# Patient Record
Sex: FEMALE | Race: WHITE | URBAN - METROPOLITAN AREA
[De-identification: names, ages, dates, MRNs, and addresses within clinical notes are randomized per-mention and may not be internally consistent; named-entity substitution may affect disease eponyms.]

---

## 2022-06-20 ENCOUNTER — OFFICE VISIT (OUTPATIENT)
Dept: ORTHOPEDIC SURGERY | Age: 52
End: 2022-06-20
Payer: COMMERCIAL

## 2022-06-20 VITALS — HEIGHT: 62 IN | WEIGHT: 150 LBS | BODY MASS INDEX: 27.6 KG/M2

## 2022-06-20 DIAGNOSIS — G89.29 CHRONIC PAIN OF RIGHT KNEE: Primary | ICD-10-CM

## 2022-06-20 DIAGNOSIS — M25.561 CHRONIC PAIN OF RIGHT KNEE: Primary | ICD-10-CM

## 2022-06-20 DIAGNOSIS — M70.61 GREATER TROCHANTERIC BURSITIS OF RIGHT HIP: ICD-10-CM

## 2022-06-20 PROCEDURE — 20610 DRAIN/INJ JOINT/BURSA W/O US: CPT | Performed by: ORTHOPAEDIC SURGERY

## 2022-06-20 PROCEDURE — 20600 DRAIN/INJ JOINT/BURSA W/O US: CPT | Performed by: ORTHOPAEDIC SURGERY

## 2022-06-20 RX ORDER — BUPIVACAINE HYDROCHLORIDE 2.5 MG/ML
6 INJECTION, SOLUTION INFILTRATION; PERINEURAL ONCE
Status: COMPLETED | OUTPATIENT
Start: 2022-06-20 | End: 2022-06-20

## 2022-06-20 RX ORDER — TRIAMCINOLONE ACETONIDE 40 MG/ML
40 INJECTION, SUSPENSION INTRA-ARTICULAR; INTRAMUSCULAR ONCE
Status: COMPLETED | OUTPATIENT
Start: 2022-06-20 | End: 2022-06-20

## 2022-06-20 RX ADMIN — BUPIVACAINE HYDROCHLORIDE 15 MG: 2.5 INJECTION, SOLUTION INFILTRATION; PERINEURAL at 10:09

## 2022-06-20 RX ADMIN — TRIAMCINOLONE ACETONIDE 40 MG: 40 INJECTION, SUSPENSION INTRA-ARTICULAR; INTRAMUSCULAR at 10:09

## 2022-06-20 NOTE — PROGRESS NOTES
Abeba Lindsay (: 1970) is a 47 y.o. female, established patient, here for evaluation of the following chief complaint(s):  Hip Pain and Knee Pain       ASSESSMENT/PLAN:  Below is the assessment and plan developed based on review of pertinent history, physical exam, labs, studies, and medications. She elected to try repeat corticosteroid injection for the right knee and the right hip knowing that she has a long road trip coming up to [place]. We discussed the risks and benefits of injection and informed consent was obtained. After a sterile preparation, 6 cc of bupivicaine and 40 mg of Kenalog were injected into the right hip trochanteric bursa and right knee. The patient tolerated the procedure well and there were no complications. Post injection pain, blood sugar elevation, skin discoloration, fatty atrophy and the signs of infection were discussed in detail. The patient was instructed to contact us if there were any questions or concerns prior to their follow up appointment. 1. Chronic pain of right knee  -     bupivacaine HCl (MARCAINE) 0.25 % (2.5 mg/mL) injection 15 mg; 15 mg (6 mL), Other, ONCE, 1 dose, On 22 at 1100  -     triamcinolone acetonide (KENALOG-40) 40 mg/mL injection 40 mg; 40 mg, Intra artICUlar, ONCE, 1 dose, On 22 at 1100  -     bupivacaine HCl (MARCAINE) 0.25 % (2.5 mg/mL) injection 15 mg; 15 mg (6 mL), Other, ONCE, 1 dose, On 22 at 1100  -     triamcinolone acetonide (KENALOG-40) 40 mg/mL injection 40 mg; 40 mg, Intra artICUlar, ONCE, 1 dose, On 22 at 1100  2.  Greater trochanteric bursitis of right hip  -     bupivacaine HCl (MARCAINE) 0.25 % (2.5 mg/mL) injection 15 mg; 15 mg (6 mL), Other, ONCE, 1 dose, On 22 at 1100  -     triamcinolone acetonide (KENALOG-40) 40 mg/mL injection 40 mg; 40 mg, Intra artICUlar, ONCE, 1 dose, On 22 at 1100  -     bupivacaine HCl (MARCAINE) 0.25 % (2.5 mg/mL) injection 15 mg; 15 mg (6 mL), Other, ONCE, 1 dose, On 22 at 1100  -     triamcinolone acetonide (KENALOG-40) 40 mg/mL injection 40 mg; 40 mg, Intra artICUlar, ONCE, 1 dose, On 22 at 1100      Return in about 3 months (around 2022), or if symptoms worsen or fail to improve. SUBJECTIVE/OBJECTIVE:  Khushbu Crespo (: 1970) is a 47 y.o. female. She notes recurrent aching pain in the right hip trochanteric bursa and in the right knee. She denies any new injuries and notes significant relief with previous corticosteroid injections a few years ago. No Known Allergies    Current Outpatient Medications   Medication Sig    levothyroxine (SYNTHROID) 25 mcg tablet Take  by mouth Daily (before breakfast).  LEVONORGESTREL (MIRENA IU) by IntraUTERine route.      Current Facility-Administered Medications   Medication    bupivacaine HCl (MARCAINE) 0.25 % (2.5 mg/mL) injection 15 mg    triamcinolone acetonide (KENALOG-40) 40 mg/mL injection 40 mg    bupivacaine HCl (MARCAINE) 0.25 % (2.5 mg/mL) injection 15 mg    triamcinolone acetonide (KENALOG-40) 40 mg/mL injection 40 mg       Social History     Socioeconomic History    Marital status:      Spouse name: Not on file    Number of children: Not on file    Years of education: Not on file    Highest education level: Not on file   Occupational History    Not on file   Tobacco Use    Smoking status: Never Smoker    Smokeless tobacco: Not on file   Substance and Sexual Activity    Alcohol use: No    Drug use: Not on file    Sexual activity: Not on file   Other Topics Concern    Not on file   Social History Narrative    Not on file     Social Determinants of Health     Financial Resource Strain:     Difficulty of Paying Living Expenses: Not on file   Food Insecurity:     Worried About Running Out of Food in the Last Year: Not on file    Sudhakar of Food in the Last Year: Not on file   Transportation Needs:     Lack of Transportation (Medical): Not on file    Lack of Transportation (Non-Medical): Not on file   Physical Activity:     Days of Exercise per Week: Not on file    Minutes of Exercise per Session: Not on file   Stress:     Feeling of Stress : Not on file   Social Connections:     Frequency of Communication with Friends and Family: Not on file    Frequency of Social Gatherings with Friends and Family: Not on file    Attends Gnosticist Services: Not on file    Active Member of [address] [place] [address] [employer] or Organizations: Not on file    Attends Club or Organization Meetings: Not on file    Marital Status: Not on file   Intimate Partner Violence:     Fear of Current or Ex-Partner: Not on file    Emotionally Abused: Not on file    Physically Abused: Not on file    Sexually Abused: Not on file   Housing Stability:     Unable to Pay for Housing in the Last Year: Not on file    Number of [place] in the Last Year: Not on file    Unstable Housing in the Last Year: Not on file       History reviewed. No pertinent surgical history. Family History   Problem Relation Age of Onset    Stroke Father     Stroke Maternal Grandmother     Diabetes Maternal Grandmother     Stroke Paternal Grandfather     Cancer Paternal Grandfather         OB History    No obstetric history on file. REVIEW OF SYSTEMS:  ROS     Positive for: Musculoskeletal    Last edited by Jonny Tucker on 6/20/2022  9:38 AM. (History)        Patient denies any recent fever, chills, nausea, vomiting, chest pain, or shortness of breath. Vitals:  Ht 5' 2\" (1.575 m)   Wt 150 lb (68 kg)   BMI 27.44 kg/m²    Body mass index is 27.44 kg/m². PHYSICAL EXAM:  General exam: Patient is awake, alert, and oriented x3. Well-appearing. No acute distress. Ambulates with an antalgic gait    Right hip: Neurovascular and sensory intact. There is point tenderness palpation at the lateral hip in the area of the trochanteric bursa. Mild swelling. No erythema or ecchymosis.   There is normal range of motion of the hip with some lateral sided pain with full flexion and internal rotation. Normal stability. No crepitus. Right knee: Neurovascular and sensory intact. There is tenderness to palpation in the parapatellar region. There is crepitus with range of motion. No significant effusion noted. There is no joint line tenderness to palpation. Nataly's maneuver is nonpainful. Normal stability on Lachman's exam and anterior/posterior drawer testing. No erythema or ecchymosis. IMAGING:  MRI Results (most recent):  No results found for this or any previous visit. Previous x-rays and MRI of the right knee show evidence of subchondral cyst formation at the inferior pole patella. There is evidence of cartilage thinning at the patella and horizontal signal change at the posterior medial meniscus. XR Results (most recent):  No results found for this or any previous visit. Orders Placed This Encounter    bupivacaine HCl (MARCAINE) 0.25 % (2.5 mg/mL) injection 15 mg    triamcinolone acetonide (KENALOG-40) 40 mg/mL injection 40 mg    bupivacaine HCl (MARCAINE) 0.25 % (2.5 mg/mL) injection 15 mg    triamcinolone acetonide (KENALOG-40) 40 mg/mL injection 40 mg              An electronic signature was used to authenticate this note.   -- Shine Harry DO